# Patient Record
Sex: FEMALE | Race: WHITE
[De-identification: names, ages, dates, MRNs, and addresses within clinical notes are randomized per-mention and may not be internally consistent; named-entity substitution may affect disease eponyms.]

---

## 2019-01-06 ENCOUNTER — HOSPITAL ENCOUNTER (EMERGENCY)
Dept: HOSPITAL 53 - M ED | Age: 24
Discharge: HOME | End: 2019-01-06
Payer: COMMERCIAL

## 2019-01-06 VITALS — WEIGHT: 110.23 LBS | BODY MASS INDEX: 19.53 KG/M2 | HEIGHT: 63 IN

## 2019-01-06 VITALS — SYSTOLIC BLOOD PRESSURE: 92 MMHG | DIASTOLIC BLOOD PRESSURE: 52 MMHG

## 2019-01-06 DIAGNOSIS — O99.89: Primary | ICD-10-CM

## 2019-01-06 DIAGNOSIS — R10.2: ICD-10-CM

## 2019-01-06 DIAGNOSIS — Z3A.12: ICD-10-CM

## 2019-01-06 LAB
ALBUMIN SERPL BCG-MCNC: 3.5 GM/DL (ref 3.2–5.2)
ALT SERPL W P-5'-P-CCNC: 20 U/L (ref 12–78)
AMYLASE SERPL-CCNC: 40 U/L (ref 25–115)
BASOPHILS # BLD AUTO: 0 10^3/UL (ref 0–0.2)
BASOPHILS NFR BLD AUTO: 0.3 % (ref 0–1)
BILIRUB CONJ SERPL-MCNC: < 0.1 MG/DL (ref 0–0.2)
BILIRUB SERPL-MCNC: 0.3 MG/DL (ref 0.2–1)
BUN SERPL-MCNC: 6 MG/DL (ref 7–18)
CALCIUM SERPL-MCNC: 8.6 MG/DL (ref 8.5–10.1)
CHLORIDE SERPL-SCNC: 105 MEQ/L (ref 98–107)
CO2 SERPL-SCNC: 25 MEQ/L (ref 21–32)
CREAT SERPL-MCNC: 0.43 MG/DL (ref 0.55–1.3)
EOSINOPHIL # BLD AUTO: 0.1 10^3/UL (ref 0–0.5)
EOSINOPHIL NFR BLD AUTO: 0.9 % (ref 0–3)
GFR SERPL CREATININE-BSD FRML MDRD: > 60 ML/MIN/{1.73_M2} (ref 60–?)
GLUCOSE SERPL-MCNC: 61 MG/DL (ref 70–100)
HCT VFR BLD AUTO: 38.5 % (ref 36–47)
HGB BLD-MCNC: 13.1 G/DL (ref 12–15.5)
LYMPHOCYTES # BLD AUTO: 1.3 10^3/UL (ref 1.5–6.5)
LYMPHOCYTES NFR BLD AUTO: 19.5 % (ref 24–44)
MCH RBC QN AUTO: 28.9 PG (ref 27–33)
MCHC RBC AUTO-ENTMCNC: 34 G/DL (ref 32–36.5)
MCV RBC AUTO: 84.8 FL (ref 80–96)
MONOCYTES # BLD AUTO: 0.4 10^3/UL (ref 0–0.8)
MONOCYTES NFR BLD AUTO: 6.1 % (ref 0–5)
NEUTROPHILS # BLD AUTO: 4.9 10^3/UL (ref 1.8–7.7)
NEUTROPHILS NFR BLD AUTO: 72.8 % (ref 36–66)
PLATELET # BLD AUTO: 239 10^3/UL (ref 150–450)
POTASSIUM SERPL-SCNC: 3.3 MEQ/L (ref 3.5–5.1)
PROT SERPL-MCNC: 6.9 GM/DL (ref 6.4–8.2)
RBC # BLD AUTO: 4.54 10^6/UL (ref 4–5.4)
SODIUM SERPL-SCNC: 138 MEQ/L (ref 136–145)
WBC # BLD AUTO: 6.8 10^3/UL (ref 4–10)

## 2019-01-06 NOTE — REP
FIRST TRIMESTER ULTRASOUND:

 

HISTORY:  Pelvic discomfort.

 

A single intrauterine pregnancy is present in vertex presentation.

 

Biparietal diameter 2 cm, 13 weeks 1 day

 

Head circumference 7.3 cm, 13 weeks 0 days

 

Abdominal circumference 5.9 cm, 12 weeks 5 days

 

Femur length 0.9 cm, 12 weeks 4 days

 

Humerus length 1 cm, no gestational age provided

 

Fetal heart rate 158 beats per minute. Fetal weight 62 grams.

 

The visualized anatomy is normal. The placenta is posterior and fundal. There is

no abruption or previa. The amniotic fluid is within normal limits. The cervix

measures 3.7 cm. The adnexa are within normal limits. There is no fluid in the

cul-de-sac.

 

IMPRESSION:

 

There is a single intrauterine pregnancy in vertex presentation with a

gestational age by ultrasound of 12 weeks 6 days. A repeat examination in 19-20

weeks may be helpful for further evaluation.

 

 

 

 

Electronically Signed by

Son Abbott MD 01/06/2019 01:48 P

## 2019-02-15 ENCOUNTER — HOSPITAL ENCOUNTER (OUTPATIENT)
Dept: HOSPITAL 53 - M LAB REF | Age: 24
End: 2019-02-15
Attending: PHYSICIAN ASSISTANT
Payer: COMMERCIAL

## 2019-02-15 DIAGNOSIS — J06.9: Primary | ICD-10-CM

## 2020-10-05 ENCOUNTER — HOSPITAL ENCOUNTER (EMERGENCY)
Dept: HOSPITAL 53 - M ED | Age: 25
Discharge: HOME | End: 2020-10-05
Payer: COMMERCIAL

## 2020-10-05 VITALS — DIASTOLIC BLOOD PRESSURE: 69 MMHG | SYSTOLIC BLOOD PRESSURE: 113 MMHG

## 2020-10-05 VITALS — HEIGHT: 63 IN | WEIGHT: 114.2 LBS | BODY MASS INDEX: 20.23 KG/M2

## 2020-10-05 DIAGNOSIS — O20.0: Primary | ICD-10-CM

## 2020-10-05 DIAGNOSIS — O44.22: ICD-10-CM

## 2020-10-05 DIAGNOSIS — Z3A.14: ICD-10-CM

## 2020-10-05 DIAGNOSIS — Z87.59: ICD-10-CM

## 2020-10-05 LAB
AMORPH SED URNS QL MICRO: (no result)
APPEARANCE UR: CLEAR
BACTERIA UR QL AUTO: NEGATIVE
BASOPHILS # BLD AUTO: 0 10^3/UL (ref 0–0.2)
BASOPHILS NFR BLD AUTO: 0.3 % (ref 0–1)
BILIRUB UR QL STRIP.AUTO: NEGATIVE
EOSINOPHIL # BLD AUTO: 0.1 10^3/UL (ref 0–0.5)
EOSINOPHIL NFR BLD AUTO: 1 % (ref 0–3)
GLUCOSE UR QL STRIP.AUTO: NEGATIVE MG/DL
HCT VFR BLD AUTO: 29.8 % (ref 36–47)
HGB BLD-MCNC: 9.5 G/DL (ref 12–15.5)
HGB UR QL STRIP.AUTO: (no result)
KETONES UR QL STRIP.AUTO: NEGATIVE MG/DL
LEUKOCYTE ESTERASE UR QL STRIP.AUTO: NEGATIVE
LYMPHOCYTES # BLD AUTO: 1.4 10^3/UL (ref 1.5–5)
LYMPHOCYTES NFR BLD AUTO: 18.6 % (ref 24–44)
MCH RBC QN AUTO: 23.9 PG (ref 27–33)
MCHC RBC AUTO-ENTMCNC: 31.9 G/DL (ref 32–36.5)
MCV RBC AUTO: 75.1 FL (ref 80–96)
MONOCYTES # BLD AUTO: 0.4 10^3/UL (ref 0–0.8)
MONOCYTES NFR BLD AUTO: 4.8 % (ref 0–5)
NEUTROPHILS # BLD AUTO: 5.5 10^3/UL (ref 1.5–8.5)
NEUTROPHILS NFR BLD AUTO: 75 % (ref 36–66)
NITRITE UR QL STRIP.AUTO: NEGATIVE
PH UR STRIP.AUTO: 7 UNITS (ref 5–9)
PLATELET # BLD AUTO: 296 10^3/UL (ref 150–450)
PROT UR QL STRIP.AUTO: NEGATIVE MG/DL
RBC # BLD AUTO: 3.97 10^6/UL (ref 4–5.4)
RBC # UR AUTO: 0 /HPF (ref 0–3)
SP GR UR STRIP.AUTO: 1 (ref 1–1.03)
SQUAMOUS #/AREA URNS AUTO: 0 /HPF (ref 0–6)
UROBILINOGEN UR QL STRIP.AUTO: 0.2 MG/DL (ref 0–2)
WBC # BLD AUTO: 7.3 10^3/UL (ref 4–10)
WBC #/AREA URNS AUTO: 1 /HPF (ref 0–3)

## 2020-10-05 NOTE — REPVR
PROCEDURE INFORMATION: 

Exam: US Pregnancy After First Trimester, Transabdominal 

Exam date and time: 10/5/2020 3:59 PM 

Age: 25 years old 

Clinical indication: Lmp or gestational age (in weeks): 14; Antepartum 

complications; Bleeding; Pregnant; Additional info: Vaginal bleeding 



TECHNIQUE: 

Imaging protocol: Real-time transabdominal obstetrical ultrasound of the 

maternal pelvis and a second or third trimester pregnancy with image 

documentation. 



COMPARISON: 

1. CT ABD PELVIS WITH CONTRAST 10/27/2014 11:36 PM 

2. ME - 1ST TRIMESTER US 1/6/2019 12:38:55 PM 

3. SR - 1ST TRIMESTER US 10/24/2015 6:03:52 AM 

4. SR - 1ST TRIMESTER US 10/23/2015 10:51:25 AM 



FINDINGS: 



Gestation: There is a single live intrauterine gestation. 

Fetal heart rate: Fetal heart motion was observed, with fetal heart rate of 163 

bpm. 

Presentation: Variable presentation. 

Placenta: The placenta is posterior. There is a slightly hypoechoic area along 

the posterior margin of the placenta measuring 6.0 x 4.1 by 5.2 cm.  However, 

this is more echogenic than typically expected for a retroplacental hemorrhage. 

This has echogenicity similar to adjacent uterine wall. This could represent a 

contraction or fibroid. This may just represent the contour of the uterine 

wall, as the posterior uterine wall had a similar configuration on prior Ob 

ultrasound from 01/06/2019. Placental grade is 0. 

Amniotic fluid: Amniotic fluid is normal for gestational age. 



FETAL ANATOMY: 

Fetal anatomic survey was not performed due to early gestational age.



BIOMETRY: 

Gestational age (AUA): Gestational age based on current ultrasound is 14 weeks 

3 days. Estimated gestational age based on last menstrual period is 14 weeks 0 

days. Fetal anatomic survey was not performed due to early gestational age. 

Estimated due date (AUA): Estimated date of delivery based on current 

ultrasound is 04/02/2021. Estimated date of delivery based on last menstrual 

period is 04/05/2021. 

Estimated fetal weight: Estimated fetal weight is 100 g (0 lb 3 oz) 

Biparietal diameter: Biparietal diameter 2.7 cm, 14 weeks 5 days, 78 percentile 

Head circumference: Head circumference 9.8 cm, 14 weeks 4 days, 69th percentile 

Abdominal circumference: Abdominal circumference 9.0 cm, 15 weeks 1 day, 81st 

percentile 

Femur length: Femur length 1.3 cm, 13 weeks 6 days, 48th percentile 

Crown-rump length: Crown-rump length 8.3 cm, 14 weeks 2 days, 60th percentile

Humeral length: Humeral length  1.5 cm, 14 weeks 1 day, 53rd percentile 

Fetal biometric ratios: The ratio of head circumference to abdominal 

circumference is slightly low at 1.09 (normal 1.11-1.30) 



MATERNAL ANATOMY: 

Uterus: See "Placenta" finding. 

Cervix: The cervix is closed, measuring 3.0 cm in length. The image showing 

measurement of the cervix suggests placenta previa, but placenta previa was not 

reported as an observation by the sonographer. 

Right adnexa: The right ovary measures 2.8 x 4.2 x 1.9 cm. There is a small 

corpus luteum cyst in the right ovary measuring 1.3 x 1.7 x 1.1 cm. 

Left adnexa: Left ovary was not visualized. 

Intraperitoneal space: No free fluid. 



IMPRESSION: 

1. Single live intrauterine gestation with estimated gestational age of 14 

weeks 3 days.

2. 6 cm slightly hypoechoic region posterior to the placenta, more echogenic 

than expected for retroplacental hemorrhage.  This has echogenicity similar to 

adjacent uterine wall.  [place] contraction or uterine fibroid are 

possible.  However, this is most likely a variation in the contour of the 

uterine wall, as the posterior uterine wall had a similar configuration on 

prior ultrasound from 01/06/2019. 

3. Cervix is closed measuring 3.0 cm.  On the cervical length measurement 

image, it is difficult to exclude placenta previa.  Suggest follow-up 

ultrasound.

4. The ratio of head circumference to abdominal circumference is slightly low 

as detailed above.  Suggest attention to this on follow-up ultrasound.

5. Fetal anatomic survey not performed due to early gestational age.  Recommend 

standard 20 week Ob ultrasound with full anatomic survey.  



Electronically signed by: Aaliyah Owens On 10/05/2020  17:20:35 PM